# Patient Record
Sex: MALE | Race: OTHER
[De-identification: names, ages, dates, MRNs, and addresses within clinical notes are randomized per-mention and may not be internally consistent; named-entity substitution may affect disease eponyms.]

---

## 2023-05-01 ENCOUNTER — APPOINTMENT (OUTPATIENT)
Dept: UROLOGY | Facility: CLINIC | Age: 33
End: 2023-05-01
Payer: SELF-PAY

## 2023-05-01 VITALS
HEIGHT: 70.08 IN | SYSTOLIC BLOOD PRESSURE: 142 MMHG | TEMPERATURE: 98 F | WEIGHT: 165.35 LBS | DIASTOLIC BLOOD PRESSURE: 90 MMHG | BODY MASS INDEX: 23.67 KG/M2

## 2023-05-01 DIAGNOSIS — N48.6 INDURATION PENIS PLASTICA: ICD-10-CM

## 2023-05-01 DIAGNOSIS — Z82.49 FAMILY HISTORY OF ISCHEMIC HEART DISEASE AND OTHER DISEASES OF THE CIRCULATORY SYSTEM: ICD-10-CM

## 2023-05-01 DIAGNOSIS — K58.9 IRRITABLE BOWEL SYNDROME W/OUT DIARRHEA: ICD-10-CM

## 2023-05-01 DIAGNOSIS — N48.89 OTHER SPECIFIED DISORDERS OF PENIS: ICD-10-CM

## 2023-05-01 DIAGNOSIS — Z78.9 OTHER SPECIFIED HEALTH STATUS: ICD-10-CM

## 2023-05-01 DIAGNOSIS — N52.9 MALE ERECTILE DYSFUNCTION, UNSPECIFIED: ICD-10-CM

## 2023-05-01 DIAGNOSIS — K50.90 CROHN'S DISEASE, UNSPECIFIED, W/OUT COMPLICATIONS: ICD-10-CM

## 2023-05-01 DIAGNOSIS — Z00.00 ENCOUNTER FOR GENERAL ADULT MEDICAL EXAMINATION W/OUT ABNORMAL FINDINGS: ICD-10-CM

## 2023-05-01 DIAGNOSIS — E78.00 PURE HYPERCHOLESTEROLEMIA, UNSPECIFIED: ICD-10-CM

## 2023-05-01 PROCEDURE — 99215 OFFICE O/P EST HI 40 MIN: CPT

## 2023-05-01 NOTE — ASSESSMENT
[FreeTextEntry1] : The patient scheduled this consultation to discuss the different treatment options available for his organic erectile dysfunction. The following note describes the conversation that was performed today during the consultation. \par \par  I reviewed the Patient History Form which the patient filled out, made sure that his ailment was organic erectile dysfunction and I discussed in detail with him all previously tried treatments for his ED. We had a thorough discussion about all of the alternatives available, and I made sure to include in our discussion pills such as Levitra/Viagra/Cialis, as well as penile self injectable therapies, MUSE (Medicated Urethral System for Erection), vacuum device, and penile implant.  I stressed the risks and benefits and pros and cons of each of these options extensively. A power point presentation was also used to illustrate each treatment option. The patient was also provided with a packet of written information as well as a list of patients to speak to on the phone.  \par \par In discussing penile implant surgery, the patient was made aware of the different types of penile implants- including semirigid devices, 2-piece or Ambicor (AMS) devices, and the inflatable penile prosthesis with 3 components. I went on to mention that there are 2 brands of devices, Coloplast and AMS, and that the AMS is impregnated with antibiotic (inhibizone), and the Coloplast is dipped then coated with an antibiotic. I also referred him to my website in order to obtain additional information about the types of implants available.  He felt he would defer to my judgment as to which device to use.\par \par I also described the highlights and benefits of the "No-Touch" surgical technique and outcome data including number of procedures previously performed and updated rate of infection. In this initial discussion of the penile implant option, I made sure we had a very long and spike discussion about the risks.  I stated that, first and foremost, infection is the most dreaded risk and complication, which range in incidence from 1-3% of all cases performed in the [place], but that in my hands, using the "No-Touch" technique the incidence of infection is less than 1%.  I stated that should infection occur, the entire device would need to be removed, which typically happens in the first several weeks after surgery.  I explained that should this occur, there would likely be corporal fibrosis, scarring, penile shortening and even penile necrosis and disfigurement.  I said that while I would do absolutely everything possible to reduce and mitigate this risk, if it occur, the device would have to be removed, and then a salvage procedure with a semi-rigid implant possibly done, or the device would have to be removed with delayed re-implantation, or simply avoid future surgery completely.  I explained what this salvage procedure is, and that a new implant could be placed in the same setting with a complex irrigation of antibiotics and saline lavage, but that the infection risk at this salvage procedure is even higher, up to 30%. Furthermore the possible need for hospitalization, prolonged intravenous antibiotics and need further additional surgery was also discussed.  The patient was informed that if the salvage operation failed or if the infected implant were to be removed completely that significant shortening of the penis would occur making implantation of another device very difficult with very poor outcome and patient satisfaction. I explained that this is a real and significant risk that has to be weighed and considered.\par \par Next I expounded on the other risks of the operation.  These include injury to the urethra or bladder, and should these occur, the operation would have to be altered or aborted.  I explained that very rarely, vascular injury and bleeding can happen, and if iliac vein injury occurs from reservoir placement, this could be catastrophic and result in major blood loss and theoretically risk of leg loss in severe instances.  \par \par I went on to discuss that after the implant is placed, penile shortening could likely occur, and this is up to 1-2cm total.  Some of this is due to lack of glans engorgement, though MUSE could be used post-operatively to reduce this factor.  Next I also explained the risk of dissatisfaction with the cosmetic or functional result of the device, meaning that he could simply be unhappy with the result.  Some people find that while they have a good full erection, they have changes in sensation, difficulty obtaining an orgasm, and dissatisfaction with sex in general.  I made sure he verbalized and demonstrated a good understanding of these points.\par \par Next, I explained the risk of device breakage or failure, and future operations might be needed should this occur to fix the device tubing breakages with fluid leaks.  There is also a risk of auto-inflation, and even inability to successfully use the device due to technical considerations and inability to use or find the pump.  I did state that I would be available to him to teach him and train him to use his device, and also available to treat any other issue mentioned above such as device breakage or auto-inflation.\par \par Next we discussed the fact that rarely further minor surgery may be needed to make final adjustments to the penile implant. Reasons for this would be to adjust the length of the cylinders, reposition the pump or location of the reservoir. \par \par Prior to scheduling surgery the patient was asked to read the material which is provided to him today, to see a cardiologist to obtain a medical clearance, to visit our website [email or web address]   to obtain additional information, to call patients who were previously implanted and to discuss his options with his partner. Before leaving the consultation, I made sure he verbalized understanding all the risk and benefits, and pros and cons of surgery.  He had the ability to ask questions, and I also explained to him what to expect from the surgery.  I made sure he had access to literature to read, and offered him the ability to speak to prior patients of mine to get a sense of what to expect, and that these reports would hopefully be as unbiased as possible. If he remains interested in having an implant he understands that he will need to schedule a penile Duplex ultrasound study during which measurements of the penis will be made.\par \par The patient brought several documents from his evaluation in [place].  These include penile duplex ultrasonography and 2 MRI studies of the corpora.  All of these studies were reviewed in the presence of the patient.\par \par \par

## 2023-05-01 NOTE — HISTORY OF PRESENT ILLNESS
[FreeTextEntry1] : The patient had a telemedicine consultation from [facility] year ago.  He complains of unreliable erections as well as an induration at the  side of the distal left corpora.  Since March 2022 his symptoms have not changed.  He is refraining from sexual activity with partners because of the unreliable erections.  The patient reports having previous episodes of very vigorous intercourse with erections that were very firm due to taking Cialis as well as engaging in sexual activity using ecstasy.